# Patient Record
Sex: MALE | Race: BLACK OR AFRICAN AMERICAN | URBAN - METROPOLITAN AREA
[De-identification: names, ages, dates, MRNs, and addresses within clinical notes are randomized per-mention and may not be internally consistent; named-entity substitution may affect disease eponyms.]

---

## 2017-11-09 ENCOUNTER — HISTORICAL (OUTPATIENT)
Dept: ADMINISTRATIVE | Facility: HOSPITAL | Age: 62
End: 2017-11-09

## 2022-05-01 NOTE — OP NOTE
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    PROCEDURES:    1. #56843 development of large zig-zag flap ulnar aspect of right wrist.    2. #98132 & #55111 decompression of median nerve right wrist and decompression of ulnar nerve right wrist.    ASSISTANT:  Michael Zepeda - Surgical Nurse    PROCEDURE IN DETAIL:  The patient was prepped and draped in the usual manner after axillary block had been administered.  Light sedation was given.  A pneumatic tourniquet was inflated to 280 mmHg after the arm had been exsanguinated.  A zig-zag incision was made down the volar aspect of the wrist and entered into the carpal tunnel just proximal to the wrist crease until I could see the nerve.  I stayed on the ulnar aspect of the nerve to avoid injuring any of the branches of the nerve.  Under direct vision, I completely opened the fibrous carpal tunnel all the way down to the trifurcation of the median nerve.  Everything was under direct vision.  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  This was taking up room in the carpal tunnel and adding to the compression neuropathy.  I removed as much of the synovium as I could in the wrist, as well as removed a generous area of the muscle that was in the carpal tunnel.  Photographs were taken of this and tissue was sent to the laboratory.       The nerve looked normal after I decompressed it.  There was evidence of severe compression of it.       I then opened up the ulnar side of the carpal tunnel so I could decompress the ulnar nerve.  There was a tremendous amount of inflammatory synovitis in this area and I think he had a very significant compression neuropathy of the ulnar nerve.  I freed this up all the way through the wrist.       The wrist was then thoroughly irrigated with saline solution.   1/2 ml of Kenalog was instilled in the wound and the wound closed in multiple layers utilizing Vicryl in the deep, 4-0 subcuticular Monocryl and in the skin some interrupted mattress stitches.  Penrose drain was brought out through the proximal aspect of the incision.         A large compressive dressing with universal hand splint was then applied.  The patient tolerated the procedure well and went to the Recovery Room in good condition.        ______________________________  MD CONNIE [place]/WILLIS  DD:  11/09/2017  Time:  12:11PM  DT:  11/09/2017  Time:  03:02PM  Job #:  [id number]